# Patient Record
Sex: MALE | Race: WHITE | NOT HISPANIC OR LATINO
[De-identification: names, ages, dates, MRNs, and addresses within clinical notes are randomized per-mention and may not be internally consistent; named-entity substitution may affect disease eponyms.]

---

## 2021-06-08 ENCOUNTER — APPOINTMENT (OUTPATIENT)
Dept: VASCULAR SURGERY | Facility: CLINIC | Age: 71
End: 2021-06-08
Payer: MEDICARE

## 2021-06-08 VITALS
HEIGHT: 68 IN | BODY MASS INDEX: 35.77 KG/M2 | OXYGEN SATURATION: 95 % | SYSTOLIC BLOOD PRESSURE: 150 MMHG | HEART RATE: 49 BPM | WEIGHT: 236 LBS | DIASTOLIC BLOOD PRESSURE: 77 MMHG | TEMPERATURE: 97.5 F

## 2021-06-08 PROCEDURE — 99203 OFFICE O/P NEW LOW 30 MIN: CPT

## 2021-06-08 PROCEDURE — 93880 EXTRACRANIAL BILAT STUDY: CPT

## 2021-09-21 ENCOUNTER — APPOINTMENT (OUTPATIENT)
Dept: VASCULAR SURGERY | Facility: CLINIC | Age: 71
End: 2021-09-21
Payer: MEDICARE

## 2021-09-21 VITALS
DIASTOLIC BLOOD PRESSURE: 70 MMHG | OXYGEN SATURATION: 98 % | TEMPERATURE: 97.2 F | BODY MASS INDEX: 36.37 KG/M2 | HEART RATE: 44 BPM | WEIGHT: 240 LBS | HEIGHT: 68 IN | SYSTOLIC BLOOD PRESSURE: 186 MMHG

## 2021-09-21 VITALS — SYSTOLIC BLOOD PRESSURE: 191 MMHG | DIASTOLIC BLOOD PRESSURE: 82 MMHG

## 2021-09-21 PROCEDURE — 99213 OFFICE O/P EST LOW 20 MIN: CPT

## 2021-09-21 PROCEDURE — 93880 EXTRACRANIAL BILAT STUDY: CPT

## 2022-05-23 ENCOUNTER — EMERGENCY (EMERGENCY)
Facility: HOSPITAL | Age: 72
LOS: 1 days | Discharge: HOME | End: 2022-05-23
Attending: EMERGENCY MEDICINE
Payer: COMMERCIAL

## 2022-05-23 VITALS
SYSTOLIC BLOOD PRESSURE: 186 MMHG | DIASTOLIC BLOOD PRESSURE: 88 MMHG | RESPIRATION RATE: 16 BRPM | HEIGHT: 71 IN | WEIGHT: 248.24 LBS | TEMPERATURE: 99 F | HEART RATE: 72 BPM | OXYGEN SATURATION: 99 %

## 2022-05-23 VITALS
RESPIRATION RATE: 18 BRPM | OXYGEN SATURATION: 98 % | SYSTOLIC BLOOD PRESSURE: 177 MMHG | DIASTOLIC BLOOD PRESSURE: 75 MMHG | HEART RATE: 63 BPM

## 2022-05-23 DIAGNOSIS — I16.0 HYPERTENSIVE URGENCY: ICD-10-CM

## 2022-05-23 DIAGNOSIS — E78.5 HYPERLIPIDEMIA, UNSPECIFIED: ICD-10-CM

## 2022-05-23 DIAGNOSIS — I48.92 UNSPECIFIED ATRIAL FLUTTER: ICD-10-CM

## 2022-05-23 LAB
ALBUMIN SERPL ELPH-MCNC: 3.9 G/DL — SIGNIFICANT CHANGE UP (ref 3.3–5.2)
ALP SERPL-CCNC: 65 U/L — SIGNIFICANT CHANGE UP (ref 40–120)
ALT FLD-CCNC: 18 U/L — SIGNIFICANT CHANGE UP
ANION GAP SERPL CALC-SCNC: 12 MMOL/L — SIGNIFICANT CHANGE UP (ref 5–17)
APTT BLD: 27.9 SEC — SIGNIFICANT CHANGE UP (ref 27.5–35.5)
AST SERPL-CCNC: 11 U/L — SIGNIFICANT CHANGE UP
BASOPHILS # BLD AUTO: 0.04 K/UL — SIGNIFICANT CHANGE UP (ref 0–0.2)
BASOPHILS NFR BLD AUTO: 0.5 % — SIGNIFICANT CHANGE UP (ref 0–2)
BILIRUB SERPL-MCNC: 0.9 MG/DL — SIGNIFICANT CHANGE UP (ref 0.4–2)
BUN SERPL-MCNC: 24.9 MG/DL — HIGH (ref 8–20)
CALCIUM SERPL-MCNC: 9 MG/DL — SIGNIFICANT CHANGE UP (ref 8.6–10.2)
CHLORIDE SERPL-SCNC: 101 MMOL/L — SIGNIFICANT CHANGE UP (ref 98–107)
CO2 SERPL-SCNC: 28 MMOL/L — SIGNIFICANT CHANGE UP (ref 22–29)
CREAT SERPL-MCNC: 1.02 MG/DL — SIGNIFICANT CHANGE UP (ref 0.5–1.3)
EGFR: 80 ML/MIN/1.73M2 — SIGNIFICANT CHANGE UP
EOSINOPHIL # BLD AUTO: 0.29 K/UL — SIGNIFICANT CHANGE UP (ref 0–0.5)
EOSINOPHIL NFR BLD AUTO: 3.4 % — SIGNIFICANT CHANGE UP (ref 0–6)
GLUCOSE SERPL-MCNC: 208 MG/DL — HIGH (ref 70–99)
HCT VFR BLD CALC: 42.5 % — SIGNIFICANT CHANGE UP (ref 39–50)
HGB BLD-MCNC: 14.2 G/DL — SIGNIFICANT CHANGE UP (ref 13–17)
IMM GRANULOCYTES NFR BLD AUTO: 0.2 % — SIGNIFICANT CHANGE UP (ref 0–1.5)
INR BLD: 1.32 RATIO — HIGH (ref 0.88–1.16)
LYMPHOCYTES # BLD AUTO: 2.33 K/UL — SIGNIFICANT CHANGE UP (ref 1–3.3)
LYMPHOCYTES # BLD AUTO: 27.2 % — SIGNIFICANT CHANGE UP (ref 13–44)
MCHC RBC-ENTMCNC: 29.1 PG — SIGNIFICANT CHANGE UP (ref 27–34)
MCHC RBC-ENTMCNC: 33.4 GM/DL — SIGNIFICANT CHANGE UP (ref 32–36)
MCV RBC AUTO: 87.1 FL — SIGNIFICANT CHANGE UP (ref 80–100)
MONOCYTES # BLD AUTO: 0.53 K/UL — SIGNIFICANT CHANGE UP (ref 0–0.9)
MONOCYTES NFR BLD AUTO: 6.2 % — SIGNIFICANT CHANGE UP (ref 2–14)
NEUTROPHILS # BLD AUTO: 5.36 K/UL — SIGNIFICANT CHANGE UP (ref 1.8–7.4)
NEUTROPHILS NFR BLD AUTO: 62.5 % — SIGNIFICANT CHANGE UP (ref 43–77)
NT-PROBNP SERPL-SCNC: 560 PG/ML — HIGH (ref 0–300)
PLATELET # BLD AUTO: 273 K/UL — SIGNIFICANT CHANGE UP (ref 150–400)
POTASSIUM SERPL-MCNC: 3.7 MMOL/L — SIGNIFICANT CHANGE UP (ref 3.5–5.3)
POTASSIUM SERPL-SCNC: 3.7 MMOL/L — SIGNIFICANT CHANGE UP (ref 3.5–5.3)
PROT SERPL-MCNC: 5.7 G/DL — LOW (ref 6.6–8.7)
PROTHROM AB SERPL-ACNC: 15.3 SEC — HIGH (ref 10.5–13.4)
RBC # BLD: 4.88 M/UL — SIGNIFICANT CHANGE UP (ref 4.2–5.8)
RBC # FLD: 12.9 % — SIGNIFICANT CHANGE UP (ref 10.3–14.5)
SARS-COV-2 RNA SPEC QL NAA+PROBE: SIGNIFICANT CHANGE UP
SODIUM SERPL-SCNC: 141 MMOL/L — SIGNIFICANT CHANGE UP (ref 135–145)
TROPONIN T SERPL-MCNC: <0.01 NG/ML — SIGNIFICANT CHANGE UP (ref 0–0.06)
WBC # BLD: 8.57 K/UL — SIGNIFICANT CHANGE UP (ref 3.8–10.5)
WBC # FLD AUTO: 8.57 K/UL — SIGNIFICANT CHANGE UP (ref 3.8–10.5)

## 2022-05-23 PROCEDURE — 99284 EMERGENCY DEPT VISIT MOD MDM: CPT

## 2022-05-23 PROCEDURE — 83880 ASSAY OF NATRIURETIC PEPTIDE: CPT

## 2022-05-23 PROCEDURE — 71045 X-RAY EXAM CHEST 1 VIEW: CPT | Mod: 26

## 2022-05-23 PROCEDURE — 85025 COMPLETE CBC W/AUTO DIFF WBC: CPT

## 2022-05-23 PROCEDURE — 36415 COLL VENOUS BLD VENIPUNCTURE: CPT

## 2022-05-23 PROCEDURE — 85610 PROTHROMBIN TIME: CPT

## 2022-05-23 PROCEDURE — 93306 TTE W/DOPPLER COMPLETE: CPT | Mod: 26

## 2022-05-23 PROCEDURE — U0003: CPT

## 2022-05-23 PROCEDURE — 71045 X-RAY EXAM CHEST 1 VIEW: CPT

## 2022-05-23 PROCEDURE — 85730 THROMBOPLASTIN TIME PARTIAL: CPT

## 2022-05-23 PROCEDURE — 80053 COMPREHEN METABOLIC PANEL: CPT

## 2022-05-23 PROCEDURE — U0005: CPT

## 2022-05-23 PROCEDURE — 93010 ELECTROCARDIOGRAM REPORT: CPT | Mod: 76

## 2022-05-23 PROCEDURE — 93005 ELECTROCARDIOGRAM TRACING: CPT

## 2022-05-23 PROCEDURE — 84484 ASSAY OF TROPONIN QUANT: CPT

## 2022-05-23 PROCEDURE — C8929: CPT

## 2022-05-23 PROCEDURE — 93970 EXTREMITY STUDY: CPT

## 2022-05-23 PROCEDURE — 99285 EMERGENCY DEPT VISIT HI MDM: CPT | Mod: 25

## 2022-05-23 PROCEDURE — 93970 EXTREMITY STUDY: CPT | Mod: 26

## 2022-05-23 RX ORDER — SPIRONOLACTONE 25 MG/1
1 TABLET, FILM COATED ORAL
Qty: 30 | Refills: 0
Start: 2022-05-23 | End: 2022-06-21

## 2022-05-23 RX ORDER — SPIRONOLACTONE 25 MG/1
25 TABLET, FILM COATED ORAL DAILY
Refills: 0 | Status: DISCONTINUED | OUTPATIENT
Start: 2022-05-23 | End: 2022-05-28

## 2022-05-23 RX ORDER — RIVAROXABAN 15 MG-20MG
1 KIT ORAL
Qty: 30 | Refills: 0
Start: 2022-05-23 | End: 2022-06-21

## 2022-05-23 RX ORDER — HYDROCHLOROTHIAZIDE 25 MG
25 TABLET ORAL ONCE
Refills: 0 | Status: DISCONTINUED | OUTPATIENT
Start: 2022-05-23 | End: 2022-05-23

## 2022-05-23 RX ORDER — RIVAROXABAN 15 MG-20MG
20 KIT ORAL ONCE
Refills: 0 | Status: COMPLETED | OUTPATIENT
Start: 2022-05-23 | End: 2022-05-23

## 2022-05-23 RX ADMIN — SPIRONOLACTONE 25 MILLIGRAM(S): 25 TABLET, FILM COATED ORAL at 18:58

## 2022-05-23 RX ADMIN — RIVAROXABAN 20 MILLIGRAM(S): KIT at 18:58

## 2022-05-23 NOTE — ED PROVIDER NOTE - NS ED ROS FT
Review of Systems  •	CONSTITUTIONAL - no  fever, no diaphoresis, no weight change  •	SKIN - no rash  •	HEMATOLOGIC - no bleeding, no bruising  •	EYES - no eye pain, no blurred vision  •	ENT - no change in hearing, no pain  •	RESPIRATORY - no shortness of breath, no cough  •	CARDIAC - no chest pain, no palpitations  •	GI - no abd pain, no nausea, no vomiting, no diarrhea, no constipation, no bleeding  •	GENITO-URINARY - no discharge, no dysuria; no hematuria,   •	ENDO - no polydipsia, no polyuria, no heat/no cold intolerance  •	MUSCULOSKELETAL - no joint pain, (+) right swelling, no redness  •	NEUROLOGIC - no weakness, no headache, no anesthesia, no paresthesias  •	PSYCH - no anxiety, non suicidal, non homicidal, no hallucination, no depression

## 2022-05-23 NOTE — CONSULT NOTE ADULT - SUBJECTIVE AND OBJECTIVE BOX
[facility]                                              CARDIOLOGY AT [facility]                                                   [address], [place] [address]                                             Telephone: [phone]. Fax:[phone]                                                       CARDIOLOGY CONSULTATION NOTE                                                                                             History obtained by: Patient and medical record  Community Cardiologist: none   obtained: Yes [  ] No [ x ]  Reason for Consultation: HTN, new Afib  Available out pt records reviewed: Yes [ x ] No [  ]    Chief complaint:    Patient is a 71y old  Male who presents with a chief complaint of     HPI:  72 y/o M with PMH HTN, HLD, DM2, gout, obesity who presents to [facility] ED after being sent in from PCP office due to new onset Afib. Patient was seen at [facility] two days ago secondary to hypertensive urgency for several days. He was given clonidine x 1 and told to follow up with his PCP. He was then sent in after being found to be in Afib at PCP office. Afib is currently rate controlled in the 70s. He was recently started on lasix for BLE edema, right leg greater than left. He endorses becoming SOB with stair climbing and needing to rest [facility] up. He also endorses waking up at times at night due to dyspnea. Denies chest pain, back pain, headache, dizziness,  BATES, diaphoresis, syncope or N/V.      CARDIAC TESTING   ECHO:    STRESS:    CATH:     ELECTROPHYSIOLOGY:     PAST MEDICAL HISTORY    PAST SURGICAL HISTORY      SOCIAL HISTORY:   CIGARETTES:  former smoker, 1 PPD x 20 years   ALCOHOL: denies  DRUGS: denies    FAMILY HISTORY:  Mother - PPM, CVA, Afib  Father - mesothelioma    Family History of Cardiovascular Disease:  Yes [ x ] No [  ]  Coronary Artery Disease in first degree relative: Yes [  ] No [ x ]  Sudden Cardiac Death in First degree relative: Yes [  ] No [ x ]    HOME MEDICATIONS:      CURRENT CARDIAC MEDICATIONS:      CURRENT OTHER MEDICATIONS:      ALLERGIES:   No Known Allergies      REVIEW OF SYMPTOMS:   CONSTITUTIONAL: No fever, no chills, no weight loss, no weight gain, no fatigue   ENMT:  No vertigo; No sinus or throat pain  NECK: No pain or stiffness  CARDIOVASCULAR: No chest pain, no dyspnea, no syncope/presyncope, no palpitations, no dizziness, no Orthopnea, + Paroxsymal nocturnal dyspnea  RESPIRATORY: + Shortness of breath, no cough, no wheezing  : No dysuria, no hematuria   GI: No dark color stool, no nausea, no diarrhea, no constipation, no abdominal pain   NEURO: No headache, no slurred speech   MUSCULOSKELETAL: No joint pain or swelling; No muscle, back, or extremity pain  PSYCH: No agitation, no anxiety.    ALL OTHER REVIEW OF SYSTEMS ARE NEGATIVE.    VITAL SIGNS:  T(C): 37.1 (05-23-22 @ 13:04), Max: 37.1 (05-23-22 @ 13:04)  T(F): 98.8 (05-23-22 @ 13:04), Max: 98.8 (05-23-22 @ 13:04)  HR: 72 (05-23-22 @ 13:04) (72 - 72)  BP: 186/88 (05-23-22 @ 13:04) (186/88 - 186/88)  RR: 16 (05-23-22 @ 13:04) (16 - 16)  SpO2: 99% (05-23-22 @ 13:04) (99% - 99%)    INTAKE AND OUTPUT:       PHYSICAL EXAM:  Constitutional: Comfortable . No acute distress.   HEENT: Atraumatic and normocephalic , neck is supple . no JVD. No carotid bruit.  CNS: A&Ox3. No focal deficits.   Respiratory: CTAB, unlabored, sliight expiratory wheeze  Cardiovascular: irregular, s1 s2. No murmur. No rubs or gallop.  Gastrointestinal: obese, Soft, non-tender. +Bowel sounds.   Extremities: 2+ Peripheral Pulses, No clubbing, cyanosis, +3 nonpitting edema BLE, R greater than L  Psychiatric: Calm . no agitation.   Skin: Warm and dry, no ulcers on extremities     LABS:        INTERPRETATION OF TELEMETRY:     ECG: Aflutter with variable AV 71 bpm, incomplete RBB  Prior ECG: Yes [  ] No [ x ]    RADIOLOGY & ADDITIONAL STUDIES:    X-ray:    CT scan:   MRI:   US:                                                [facility]                                              CARDIOLOGY AT [facility]                                                   [address], [place] [address]                                             Telephone: [phone]. Fax:[phone]                                                       CARDIOLOGY CONSULTATION NOTE                                                                                             History obtained by: Patient and medical record  Community Cardiologist: none   obtained: Yes [  ] No [ x ]  Reason for Consultation: HTN, new Afib  Available out pt records reviewed: Yes [ x ] No [  ]    Chief complaint:    Patient is a 71y old  Male who presents with a chief complaint of     HPI:  72 y/o M with PMH HTN, HLD, DM2, gout, obesity who presents to [facility] ED after being sent in from PCP office due to new onset Afib. Patient was seen at [facility] two days ago secondary to hypertensive urgency for several days. He was given clonidine x 1 and told to follow up with his PCP. He was then sent in after being found to be in Afib at PCP office. Afib is currently rate controlled in the 70s. He was recently started on lasix for BLE edema, right leg greater than left. He endorses becoming SOB with stair climbing and needing to [facility]. He also endorses waking up at times at night due to dyspnea. Denies chest pain, back pain, headache, dizziness,  BATES, diaphoresis, syncope or N/V.      CARDIAC TESTING   ECHO:    STRESS:    CATH:     ELECTROPHYSIOLOGY:     PAST MEDICAL HISTORY    PAST SURGICAL HISTORY      SOCIAL HISTORY:   CIGARETTES:  former smoker, 1 PPD x 20 years   ALCOHOL: denies  DRUGS: denies    FAMILY HISTORY:  Mother - PPM, CVA, Afib  Father - mesothelioma    Family History of Cardiovascular Disease:  Yes [ x ] No [  ]  Coronary Artery Disease in first degree relative: Yes [  ] No [ x ]  Sudden Cardiac Death in First degree relative: Yes [  ] No [ x ]    HOME MEDICATIONS:  lisinopril 40 mg  metoprolol 25 mg  lasix 20 mg daily     ALLERGIES:   No Known Allergies    REVIEW OF SYMPTOMS:   CONSTITUTIONAL: No fever, no chills, no weight loss, no weight gain, no fatigue   ENMT:  No vertigo; No sinus or throat pain  NECK: No pain or stiffness  CARDIOVASCULAR: No chest pain, no dyspnea, no syncope/presyncope, no palpitations, no dizziness, no Orthopnea, + Paroxsymal nocturnal dyspnea  RESPIRATORY: + Shortness of breath, no cough, no wheezing  : No dysuria, no hematuria   GI: No dark color stool, no nausea, no diarrhea, no constipation, no abdominal pain   NEURO: No headache, no slurred speech   MUSCULOSKELETAL: No joint pain or swelling; No muscle, back, or extremity pain  PSYCH: No agitation, no anxiety.    ALL OTHER REVIEW OF SYSTEMS ARE NEGATIVE.    VITAL SIGNS:  T(C): 37.1 (05-23-22 @ 13:04), Max: 37.1 (05-23-22 @ 13:04)  T(F): 98.8 (05-23-22 @ 13:04), Max: 98.8 (05-23-22 @ 13:04)  HR: 72 (05-23-22 @ 13:04) (72 - 72)  BP: 186/88 (05-23-22 @ 13:04) (186/88 - 186/88)  RR: 16 (05-23-22 @ 13:04) (16 - 16)  SpO2: 99% (05-23-22 @ 13:04) (99% - 99%)    PHYSICAL EXAM:  Constitutional: Comfortable . No acute distress.   HEENT: Atraumatic and normocephalic , neck is supple . no JVD. No carotid bruit.  CNS: A&Ox3. No focal deficits.   Respiratory: CTAB, unlabored, sliight expiratory wheeze  Cardiovascular: irregular, s1 s2. No murmur. No rubs or gallop.  Gastrointestinal: obese, Soft, non-tender. +Bowel sounds.   Extremities: 2+ Peripheral Pulses, No clubbing, cyanosis, +3 nonpitting edema BLE, R greater than L  Psychiatric: Calm . no agitation.   Skin: Warm and dry, no ulcers on extremities     ECG: Aflutter with variable AV 71 bpm, incomplete RBB  Prior ECG: Yes [  ] No [ x ]                                                [facility]                                              CARDIOLOGY AT [address], [place] [address]                                             Telephone: [phone]. Fax:[phone]                                                       CARDIOLOGY CONSULTATION NOTE                                                                                             History obtained by: Patient and medical record  Community Cardiologist: none   obtained: Yes [  ] No [ x ]  Reason for Consultation: HTN, new Afib  Available out pt records reviewed: Yes [ x ] No [  ]    Chief complaint:    Patient is a 71y old  Male who presents with a chief complaint of     HPI:  72 y/o M with PMH HTN, HLD, DM2, gout, obesity who presents to [facility] ED after being sent in from PCP office due to new onset Afib. Patient was seen at [facility] two days ago secondary to hypertensive urgency for several days. He was given clonidine x 1 and told to follow up with his PCP. He was then sent in after being found to be in Afib at PCP office. Afib is currently rate controlled in the 70s. He was recently started on lasix for BLE edema, right leg greater than left. He endorses becoming SOB with stair climbing and needing to rest shelter up. He also endorses waking up at times at night due to dyspnea. Denies chest pain, back pain, headache, dizziness,  BATES, diaphoresis, syncope or N/V.      CARDIAC TESTING   ECHO:    STRESS:    CATH:     ELECTROPHYSIOLOGY:     PAST MEDICAL HISTORY    PAST SURGICAL HISTORY      SOCIAL HISTORY:   CIGARETTES:  former smoker, 1 PPD x 20 years   ALCOHOL: denies  DRUGS: denies    FAMILY HISTORY:  Mother - PPM, CVA, Afib  Father - mesothelioma    Family History of Cardiovascular Disease:  Yes [ x ] No [  ]  Coronary Artery Disease in first degree relative: Yes [  ] No [ x ]  Sudden Cardiac Death in First degree relative: Yes [  ] No [ x ]    HOME MEDICATIONS:  lisinopril 40 mg  metoprolol 25 mg  lasix 20 mg daily   crestor 20 mg daily    ALLERGIES:   No Known Allergies    REVIEW OF SYMPTOMS:   CONSTITUTIONAL: No fever, no chills, no weight loss, no weight gain, no fatigue   ENMT:  No vertigo; No sinus or throat pain  NECK: No pain or stiffness  CARDIOVASCULAR: No chest pain, no dyspnea, no syncope/presyncope, no palpitations, no dizziness, no Orthopnea, + Paroxsymal nocturnal dyspnea  RESPIRATORY: + Shortness of breath, no cough, no wheezing  : No dysuria, no hematuria   GI: No dark color stool, no nausea, no diarrhea, no constipation, no abdominal pain   NEURO: No headache, no slurred speech   MUSCULOSKELETAL: No joint pain or swelling; No muscle, back, or extremity pain  PSYCH: No agitation, no anxiety.    ALL OTHER REVIEW OF SYSTEMS ARE NEGATIVE.    VITAL SIGNS:  T(C): 37.1 (05-23-22 @ 13:04), Max: 37.1 (05-23-22 @ 13:04)  T(F): 98.8 (05-23-22 @ 13:04), Max: 98.8 (05-23-22 @ 13:04)  HR: 72 (05-23-22 @ 13:04) (72 - 72)  BP: 186/88 (05-23-22 @ 13:04) (186/88 - 186/88)  RR: 16 (05-23-22 @ 13:04) (16 - 16)  SpO2: 99% (05-23-22 @ 13:04) (99% - 99%)    PHYSICAL EXAM:  Constitutional: Comfortable . No acute distress.   HEENT: Atraumatic and normocephalic , neck is supple . no JVD. No carotid bruit.  CNS: A&Ox3. No focal deficits.   Respiratory: CTAB, unlabored, sliight expiratory wheeze  Cardiovascular: irregular, s1 s2. No murmur. No rubs or gallop.  Gastrointestinal: obese, Soft, non-tender. +Bowel sounds.   Extremities: 2+ Peripheral Pulses, No clubbing, cyanosis, +3 nonpitting edema BLE, R greater than L  Psychiatric: Calm . no agitation.   Skin: Warm and dry, no ulcers on extremities     ECG: Aflutter with variable AV 71 bpm, incomplete RBB  Prior ECG: Yes [  ] No [ x ]

## 2022-05-23 NOTE — ED PROVIDER NOTE - PHYSICAL EXAMINATION
VITAL SIGNS: I have reviewed nursing notes and confirm.  CONSTITUTIONAL:  in no acute distress.  SKIN: Skin exam is warm and dry, no acute rash.  HEAD: Normocephalic; atraumatic.  EYES: PERRL, EOM intact; conjunctiva and sclera clear.  ENT: No nasal discharge; airway clear. Throat clear.  NECK: Supple; non tender.    CARD: Regular rate and rhythm.  RESP: No wheezes,  no rales or rhonchi.   ABD:  soft; non-distended; non-tender;   EXT: Normal ROM. No clubbing, cyanosis (+) b/l pitting edema, R>L   NEURO: Alert, oriented. Grossly unremarkable. No focal deficits.  moves all extremities,  normal gait   PSYCH: Cooperative, appropriate.

## 2022-05-23 NOTE — ED PROVIDER NOTE - OBJECTIVE STATEMENT
72 yo M hx of HTN and HLD send in for new onset Afib found by PMD today. no fever. no cough. no congestion. no travel. patient report right leg swelling x 1 week with more pitting edema. no  meds given in the office. sinus now.

## 2022-05-23 NOTE — CONSULT NOTE ADULT - PROBLEM SELECTOR RECOMMENDATION 2
.  - SBP 180s on admission  - metoprolol succinate 25mg PO daily  - lisinopril 40mg daily  - amlodipine contraindicated due to previous reaction (possibly LE edema?)  - recc hydralazine 25mg PO TID for further BP control  - Renal US ordered to r/o renal artery stenosis .- SBP 180s on admission  - metoprolol succinate 25mg PO daily  - lisinopril 40mg daily  - amlodipine contraindicated due to previous reaction (possibly LE edema?)  - recc hydralazine 25mg PO TID for further BP control  - Renal US ordered to r/o renal artery stenosis

## 2022-05-23 NOTE — CONSULT NOTE ADULT - ASSESSMENT
70 y/o M with PMH HTN, HLD, DM2, gout, obesity who presents to [facility] ED after being sent in from PCP office due to new onset Afib. Patient was seen at [facility] two days ago secondary to hypertensive urgency for several days. He was given clonidine x 1 and told to follow up with his PCP. He was then sent in after being found to be in Afib at PCP office. Afib is currently rate controlled in the 70s. He was recently started on lasix for BLE edema, right leg greater than left. He endorses becoming SOB with stair climbing and needing to rest nursing home up. He also endorses waking up at times at night due to dyspnea. Denies chest pain, back pain, headache, dizziness,  BATES, diaphoresis, syncope or N/V.

## 2022-05-23 NOTE — ED PROVIDER NOTE - CLINICAL SUMMARY MEDICAL DECISION MAKING FREE TEXT BOX
70 yo M send in by cards for new onset afib. seen by cardiology. will get blood work, cxr, duplex, echo.

## 2022-05-23 NOTE — CONSULT NOTE ADULT - NS ATTEND AMEND GEN_ALL_CORE FT
pt was seen and examined  plan of care dw np  Pt  is now in nsr  Due to [id number] of >3, start pt on xarelto 20 mg with supper. Risk of bleeding and stroke was dw pt.   Add aldactone 25 mg / aldactone 25 mg daily in addition to other pills  no asa  no lasix  TTE reviewed as well  FU in office in 1-2 weeks pt was seen and examined  plan of care dw np  Pt  is now in nsr  Due to [id number] of >3, start pt on xarelto 20 mg with supper. Risk of bleeding and stroke was dw pt.   Add aldactone 25 mg / aldactone 25 mg daily in addition to other pills.  advise pt to increase fluid intake   no asa  no lasix  TTE reviewed as well  FU in office in 1-2 weeks pt was seen and examined  plan of care ruthann np  Pt  is now in nsr  Due to [id number] of >3, start pt on xarelto 20 mg with supper. Risk of bleeding and stroke was dw pt.   Add aldactone 50 mg daily in addition to other pills.  advise pt to increase fluid intake.  No DVT, left leg smaller than the right leg.   no asa  no lasix  TTE reviewed as well  FU in office in 1-2 weeks  RUTHANN Rice.

## 2022-05-23 NOTE — ED ADULT TRIAGE NOTE - CHIEF COMPLAINT QUOTE
Pt sent in by cardiology for new onset AFib with controlled rate. Pt was in the office for elevated BP. Denies any palpitations, chest pain. Keke from cardiology is expecting him [phone]

## 2022-05-23 NOTE — CONSULT NOTE ADULT - PROBLEM SELECTOR RECOMMENDATION 9
.  - [id number] 3 (age, HTN, DM)  - new onset Afib/Aflutter rate controlled at this time in 70s  - pending TTE for evaluation of cardiac function and any valvular abnormalities  - patient has not had NST or TTE since 2019  - started on Lasix 3 weeks ago for BLE edema  - US b/l Legs for edema, r/o DVT  - check BNP, troponins  - may need EP consult for potential cardioversion if he does not convert on his own  - further reccs to follow .- [id number] 3 (age, HTN, DM)  - new onset Afib/Aflutter rate controlled at this time in 70s  - pending TTE for evaluation of cardiac function and any valvular abnormalities  - patient has not had NST or TTE since 2019  - started on Lasix 3 weeks ago for BLE edema  - US b/l Legs for edema, r/o DVT  - check BNP, troponins  - may need EP consult for potential cardioversion if he does not convert on his own  - further reccs to follow

## 2022-05-23 NOTE — ED ADULT NURSE NOTE - CHIEF COMPLAINT QUOTE
Pt sent in by cardiology for new onset AFib with controlled rate. Pt was in the office for elevated BP. Denies any palpitations, chest pain. Keke from cardiology is expecting him [phone]

## 2022-05-23 NOTE — ED PROVIDER NOTE - PATIENT PORTAL LINK FT
You can access the FollowMyHealth Patient Portal offered by [facility] by registering at the following website: [email or web address]. By joining [facility]’s FollowMyHealth portal, you will also be able to view your health information using other applications (apps) compatible with our system.

## 2022-05-23 NOTE — ED ADULT NURSE NOTE - NSIMPLEMENTINTERV_GEN_ALL_ED
Implemented All Universal Safety Interventions:  [place] to call system. Call bell, personal items and telephone within reach. Instruct patient to call for assistance. Room bathroom lighting operational. Non-slip footwear when patient is off stretcher. Physically safe environment: no spills, clutter or unnecessary equipment. Stretcher in lowest position, wheels locked, appropriate side rails in place.

## 2022-05-27 DIAGNOSIS — M17.12 UNILATERAL PRIMARY OSTEOARTHRITIS, LEFT KNEE: ICD-10-CM

## 2022-05-27 PROBLEM — I10 ESSENTIAL (PRIMARY) HYPERTENSION: Chronic | Status: ACTIVE | Noted: 2022-05-23

## 2022-05-27 RX ORDER — HYDROCHLOROTHIAZIDE 25 MG/1
25 TABLET ORAL
Qty: 90 | Refills: 0 | Status: DISCONTINUED | COMMUNITY
Start: 2021-09-13 | End: 2022-05-27

## 2022-06-03 ENCOUNTER — NON-APPOINTMENT (OUTPATIENT)
Age: 72
End: 2022-06-03

## 2022-06-03 ENCOUNTER — APPOINTMENT (OUTPATIENT)
Dept: CARDIOLOGY | Facility: CLINIC | Age: 72
End: 2022-06-03
Payer: MEDICARE

## 2022-06-03 VITALS
SYSTOLIC BLOOD PRESSURE: 151 MMHG | OXYGEN SATURATION: 99 % | HEIGHT: 68 IN | BODY MASS INDEX: 36.37 KG/M2 | DIASTOLIC BLOOD PRESSURE: 73 MMHG | HEART RATE: 49 BPM | TEMPERATURE: 98.4 F | WEIGHT: 240 LBS

## 2022-06-03 PROCEDURE — 93000 ELECTROCARDIOGRAM COMPLETE: CPT

## 2022-06-03 PROCEDURE — 99214 OFFICE O/P EST MOD 30 MIN: CPT

## 2022-06-03 RX ORDER — CLONIDINE HYDROCHLORIDE 0.1 MG/1
0.1 TABLET ORAL
Qty: 30 | Refills: 0 | Status: ACTIVE | COMMUNITY
Start: 2022-05-27

## 2022-06-04 RX ORDER — SPIRONOLACTONE 50 MG/1
50 TABLET ORAL
Qty: 30 | Refills: 0 | Status: DISCONTINUED | COMMUNITY
Start: 2022-05-24

## 2022-06-04 RX ORDER — FUROSEMIDE 20 MG/1
20 TABLET ORAL
Qty: 90 | Refills: 0 | Status: DISCONTINUED | COMMUNITY
Start: 2022-05-05

## 2022-06-06 ENCOUNTER — NON-APPOINTMENT (OUTPATIENT)
Age: 72
End: 2022-06-06

## 2022-06-16 ENCOUNTER — APPOINTMENT (OUTPATIENT)
Dept: CARDIOLOGY | Facility: CLINIC | Age: 72
End: 2022-06-16

## 2022-06-20 VITALS — SYSTOLIC BLOOD PRESSURE: 182 MMHG | DIASTOLIC BLOOD PRESSURE: 90 MMHG

## 2022-06-20 VITALS — HEART RATE: 73 BPM

## 2022-06-20 VITALS — SYSTOLIC BLOOD PRESSURE: 210 MMHG | DIASTOLIC BLOOD PRESSURE: 90 MMHG

## 2022-06-20 RX ORDER — VALSARTAN 320 MG/1
320 TABLET, COATED ORAL
Qty: 90 | Refills: 3 | Status: ACTIVE | COMMUNITY
Start: 2022-06-20 | End: 1900-01-01

## 2022-06-21 ENCOUNTER — NON-APPOINTMENT (OUTPATIENT)
Age: 72
End: 2022-06-21

## 2022-06-24 RX ORDER — RIVAROXABAN 20 MG/1
20 TABLET, FILM COATED ORAL
Qty: 90 | Refills: 0 | Status: ACTIVE | COMMUNITY
Start: 1900-01-01 | End: 1900-01-01

## 2022-06-28 ENCOUNTER — APPOINTMENT (OUTPATIENT)
Dept: CARDIOLOGY | Facility: CLINIC | Age: 72
End: 2022-06-28

## 2022-06-28 DIAGNOSIS — I10 ESSENTIAL (PRIMARY) HYPERTENSION: ICD-10-CM

## 2022-06-28 DIAGNOSIS — R60.0 LOCALIZED EDEMA: ICD-10-CM

## 2022-06-28 DIAGNOSIS — I48.91 UNSPECIFIED ATRIAL FIBRILLATION: ICD-10-CM

## 2022-07-16 LAB
ALDOSTERONE SERUM: 4.9 NG/DL
CORTIS SERPL-MCNC: 5.9 UG/DL
METANEPHRINE, PL: 11 PG/ML
NORMETANEPHRINE, PL: 93.1 PG/ML
RENIN ACTIVITY, PLASMA: <0.167 NG/ML/HR
RENIN PLASMA: 5.1 PG/ML

## 2022-07-29 ENCOUNTER — APPOINTMENT (OUTPATIENT)
Dept: CARDIOLOGY | Facility: CLINIC | Age: 72
End: 2022-07-29
Payer: MEDICARE

## 2022-07-29 PROCEDURE — 93975 VASCULAR STUDY: CPT

## 2022-07-29 PROCEDURE — 76770 US EXAM ABDO BACK WALL COMP: CPT | Mod: 59

## 2022-08-04 ENCOUNTER — APPOINTMENT (OUTPATIENT)
Dept: CARDIOLOGY | Facility: CLINIC | Age: 72
End: 2022-08-04

## 2022-08-04 VITALS
HEIGHT: 68 IN | TEMPERATURE: 98.7 F | OXYGEN SATURATION: 97 % | SYSTOLIC BLOOD PRESSURE: 173 MMHG | DIASTOLIC BLOOD PRESSURE: 67 MMHG | BODY MASS INDEX: 37.59 KG/M2 | WEIGHT: 248 LBS | HEART RATE: 50 BPM

## 2022-08-04 VITALS — DIASTOLIC BLOOD PRESSURE: 70 MMHG | SYSTOLIC BLOOD PRESSURE: 164 MMHG

## 2022-08-04 DIAGNOSIS — R60.0 LOCALIZED EDEMA: ICD-10-CM

## 2022-08-04 PROBLEM — I48.91 ATRIAL FIBRILLATION, UNSPECIFIED TYPE: Status: ACTIVE | Noted: 2022-06-03

## 2022-08-04 PROCEDURE — 99214 OFFICE O/P EST MOD 30 MIN: CPT

## 2022-08-04 RX ORDER — TERAZOSIN 5 MG/1
5 CAPSULE ORAL DAILY
Refills: 0 | Status: ACTIVE | COMMUNITY
Start: 2022-06-03

## 2022-08-04 RX ORDER — COLCHICINE 0.6 MG/1
0.6 TABLET ORAL
Qty: 60 | Refills: 0 | Status: DISCONTINUED | COMMUNITY
Start: 2022-03-28 | End: 2022-08-04

## 2022-08-04 RX ORDER — DICLOFENAC SODIUM 1% 10 MG/G
1 GEL TOPICAL
Qty: 1 | Refills: 1 | Status: DISCONTINUED | COMMUNITY
Start: 2022-05-27 | End: 2022-08-04

## 2022-08-04 RX ORDER — METOPROLOL SUCCINATE 25 MG/1
25 TABLET, EXTENDED RELEASE ORAL DAILY
Refills: 0 | Status: ACTIVE | COMMUNITY

## 2022-08-04 RX ORDER — TERAZOSIN 10 MG/1
10 CAPSULE ORAL
Qty: 90 | Refills: 3 | Status: DISCONTINUED | COMMUNITY
Start: 2022-06-03 | End: 2022-08-04

## 2022-08-04 RX ORDER — ALLOPURINOL 100 MG/1
100 TABLET ORAL TWICE DAILY
Refills: 0 | Status: ACTIVE | COMMUNITY

## 2022-08-04 RX ORDER — BACLOFEN 10 MG/1
10 TABLET ORAL
Qty: 30 | Refills: 0 | Status: DISCONTINUED | COMMUNITY
Start: 2022-02-25 | End: 2022-08-04

## 2022-08-04 RX ORDER — FUROSEMIDE 20 MG/1
20 TABLET ORAL DAILY
Refills: 0 | Status: ACTIVE | COMMUNITY

## 2022-08-04 RX ORDER — ALPRAZOLAM 0.25 MG/1
0.25 TABLET ORAL
Qty: 30 | Refills: 0 | Status: DISCONTINUED | COMMUNITY
Start: 2022-06-28

## 2022-08-04 RX ORDER — AMOXICILLIN 500 MG/1
500 CAPSULE ORAL
Qty: 21 | Refills: 0 | Status: DISCONTINUED | COMMUNITY
Start: 2022-06-06

## 2022-08-04 RX ORDER — POTASSIUM CHLORIDE 750 MG/1
TABLET, FILM COATED, EXTENDED RELEASE ORAL DAILY
Refills: 0 | Status: DISCONTINUED | COMMUNITY

## 2022-08-04 RX ORDER — ROSUVASTATIN CALCIUM 20 MG/1
20 TABLET, FILM COATED ORAL DAILY
Refills: 0 | Status: ACTIVE | COMMUNITY

## 2022-08-04 RX ORDER — POTASSIUM CHLORIDE 750 MG/1
10 TABLET, FILM COATED, EXTENDED RELEASE ORAL
Qty: 30 | Refills: 0 | Status: ACTIVE | COMMUNITY
Start: 2022-07-20

## 2022-08-05 ENCOUNTER — APPOINTMENT (OUTPATIENT)
Dept: CARDIOLOGY | Facility: CLINIC | Age: 72
End: 2022-08-05

## 2022-08-05 ENCOUNTER — NON-APPOINTMENT (OUTPATIENT)
Age: 72
End: 2022-08-05

## 2022-08-05 PROCEDURE — 93971 EXTREMITY STUDY: CPT

## 2022-08-08 ENCOUNTER — NON-APPOINTMENT (OUTPATIENT)
Age: 72
End: 2022-08-08

## 2022-08-08 ENCOUNTER — APPOINTMENT (OUTPATIENT)
Dept: VASCULAR SURGERY | Facility: CLINIC | Age: 72
End: 2022-08-08

## 2022-08-08 VITALS
HEIGHT: 68 IN | SYSTOLIC BLOOD PRESSURE: 107 MMHG | HEART RATE: 69 BPM | OXYGEN SATURATION: 98 % | WEIGHT: 251 LBS | DIASTOLIC BLOOD PRESSURE: 56 MMHG | TEMPERATURE: 98.1 F | RESPIRATION RATE: 16 BRPM | BODY MASS INDEX: 38.04 KG/M2

## 2022-08-08 DIAGNOSIS — I65.22 OCCLUSION AND STENOSIS OF LEFT CAROTID ARTERY: ICD-10-CM

## 2022-08-08 PROCEDURE — 93880 EXTRACRANIAL BILAT STUDY: CPT

## 2022-08-08 PROCEDURE — 99213 OFFICE O/P EST LOW 20 MIN: CPT

## 2022-08-08 RX ORDER — HYDRALAZINE HYDROCHLORIDE 50 MG/1
50 TABLET ORAL TWICE DAILY
Qty: 60 | Refills: 0 | Status: ACTIVE | COMMUNITY
Start: 2022-08-04

## 2022-08-08 NOTE — PROCEDURE
[FreeTextEntry1] : Studies: \par \par 7/19/22 CT neck with IV contrast: severe stenosis of left carotid artery. Decreased soft tissue infiltrate surrounding left carotid. \par \par 8/8/22 carotid artery duplex: right ICA <50%. left ICA 50-69% stenosis

## 2022-08-08 NOTE — DATA REVIEWED
[FreeTextEntry1] : U/S Carotid Duplex 6/8/21 demonstrates R ICA with less than 50% stenosis and L ICA with 50-69% stenosis. Vertebral flow antegrade\par U/S Carotid Duplex 9/21/21 demonstrates R ICA with less than 50% stenosis and L ICA with 50-69% stenosis. Vertebral flow antegrade.

## 2022-08-08 NOTE — HISTORY OF PRESENT ILLNESS
[FreeTextEntry1] : 9/21/21: 69 y/o male with moderate carotid stenosis here for followup. He reports no change in condition. He denies headaches, dizziness, lightheadedness, visual changes, amaurosis fugax, neurological or motor deficit. He maintains excellent control of his blood pressure. He takes Rosuvastatin 20 mg and aspirin 81 mg daily. No current pain. No fever, chills. He is a nonsmoker.\par \par 8/8/22: Pt doing well since last visit. He had a CT scan of his neck in July at [facility] and was told his left carotid artery stenosis is worse. He denies headaches, dizziness, lightheadedness, visual changes, amaurosis fugax, neurological or motor deficit. He takes Rosuvastatin 20 mg and Xarelto 20 mg for a fib. No current pain. No fever, chills. He is a nonsmoker.

## 2022-08-08 NOTE — PHYSICAL EXAM
[2+] : left 2+ [Right Carotid Bruit] : no bruit heard over the right carotid [Left Carotid Bruit] : no bruit heard over the left carotid [de-identified] : WD, WN, NAD. Awake, alert, interactive. Ambulates without difficulty [de-identified] : non-labored

## 2022-08-08 NOTE — ASSESSMENT
[FreeTextEntry1] : 72 y/o male with carotid stenosis here for followup. U/S was unchanged showing: L ICA with 50-69% stenosis. He continues to be asymptomatic. He will continue his medications are unchanged and walk daily for exercise.\par \par \par Plan:\par Continue Xarelto and Statin QD as advised.\par Maintain medical management of HTN, HLD and DM..\par Maintain a balanced diet and adequate hydration..\par Walk daily for exercise to assist with collateral circulation.\par U/S completed today. I discussed results fully with the patient. All questions and concerns have been discussed to patient satisfaction.\par RTC in 3 months for repeat carotid artery duplex \par \par A total of 25 minutes was spent with patient and coordinating care.\par \par

## 2022-08-15 ENCOUNTER — APPOINTMENT (OUTPATIENT)
Dept: CARDIOLOGY | Facility: CLINIC | Age: 72
End: 2022-08-15

## 2022-08-15 PROCEDURE — 78452 HT MUSCLE IMAGE SPECT MULT: CPT

## 2022-08-15 PROCEDURE — A9500: CPT

## 2022-08-15 PROCEDURE — 93015 CV STRESS TEST SUPVJ I&R: CPT

## 2022-08-17 ENCOUNTER — NON-APPOINTMENT (OUTPATIENT)
Age: 72
End: 2022-08-17

## 2022-08-19 ENCOUNTER — NON-APPOINTMENT (OUTPATIENT)
Age: 72
End: 2022-08-19

## 2022-09-01 ENCOUNTER — NON-APPOINTMENT (OUTPATIENT)
Age: 72
End: 2022-09-01

## 2022-09-01 ENCOUNTER — APPOINTMENT (OUTPATIENT)
Dept: CARDIOLOGY | Facility: CLINIC | Age: 72
End: 2022-09-01

## 2022-09-13 NOTE — ED ADULT NURSE NOTE - HOW OFTEN DO YOU HAVE A DRINK CONTAINING ALCOHOL?
[de-identified] : \par Indication:\par Osteoarthritis of left shoulder\par \par Consent: \par At this time, I have recommended an injection to the left shoulder.  The risks and benefits of the procedure were discussed with the patient in detail.  Upon verbal consent of the patient, we proceeded with the injection as noted below.  \par \par Description of Procedure:  \par After a sterile prep, the patient underwent an injection of approximately 3 mL of 1% Xylocaine 20 mg per 2 mL (10 mg per mL) without epinephrine and 2 mL of Kenalog (40 mg/mL) into the left shoulder.  The patient tolerated the procedure well.  There were no complications. \par \par : [place]\par Drug Name: Xylocaine-MPF (Lidocaine HCI injection, USP)\par NDC#: 78619-102-50\par Lot#:   [id number]\par Expiration Date: 05/26\par \par \par  
Monthly or less

## 2023-06-06 ENCOUNTER — APPOINTMENT (OUTPATIENT)
Dept: ORTHOPEDIC SURGERY | Facility: CLINIC | Age: 73
End: 2023-06-06
Payer: MEDICARE

## 2023-06-06 ENCOUNTER — NON-APPOINTMENT (OUTPATIENT)
Age: 73
End: 2023-06-06

## 2023-06-06 VITALS — WEIGHT: 251 LBS | BODY MASS INDEX: 38.04 KG/M2 | HEIGHT: 68 IN

## 2023-06-06 DIAGNOSIS — M47.26 OTHER SPONDYLOSIS WITH RADICULOPATHY, LUMBAR REGION: ICD-10-CM

## 2023-06-06 PROCEDURE — 99204 OFFICE O/P NEW MOD 45 MIN: CPT

## 2023-06-06 PROCEDURE — 72100 X-RAY EXAM L-S SPINE 2/3 VWS: CPT

## 2023-06-07 NOTE — HISTORY OF PRESENT ILLNESS
[de-identified] : Patient is here for right LE pain that began 3 weeks ago.He states that he has a history of sciatica. He was treated with steroid injections about 4 weeks ago. He states that his leg feels weak. He had a fall down stairs. He has been using a cane. He states today that he has noticed improvement. He states he was told to see a spine specialist in the past but he does not feel like his spine is his problem. He feels that the problem is his knee and down. He has used a knee brace. He has used Tylenol. Denies N/T/Prior injury. 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 He does not exercise. Denies / bowel or bladder dysfunction/ saddle anesthesia \par \par The patient's past medical history, past surgical history, medications and allergies were reviewed by me today and documented accordingly. In addition, the patient's family and social history, which were noncontributory to this visit, were reviewed also. Intake form was reviewed. The patient has no family history of arthritis.

## 2023-06-07 NOTE — PHYSICAL EXAM
[de-identified] : Constitutional: Well-nourished, well-developed, No acute distress\par Respiratory:  Good respiratory effort, no SOB\par Lymphatic: No regional lymphadenopathy, no lymphedema\par Psychiatric: Pleasant and normal affect, alert and oriented x3\par Musculoskeletal: normal except where as noted in regional exam\par \par Lumbar Spine Exam\par \par APPEARANCE: no marked deformities or malalignment, + loss of lordotic curvature of the lumbosacral spine. + poor posture\par POSITIVE TENDERNESS: + IL/SI/ST ligs, + TTP of b/l SI joints, + b/l lower lumbar tenderness and spasm noted in erector spinae and quadratus lumborum\par NONTENDER: no bony midline tenderness.\par ROM: Mildly limited in all directions, mildly painful at end range of flexion and extension\par RESISTIVE TESTING: painful 4/5 resisted flex/ext, sidebending b/l, and rotation\par SPECIAL TESTS: neg SLR b/l, neg ARIK b/l, neg Trendelenburg b/l \par PULSES: 2+ DP/PT pulses\par NEURO:  + Weakness on the right lower extremity with 3/5 hip flexion, knee extension, knee flexion, ankle dorsiflexion, ankle plantarflexion, great toe extension.  L1 - S2 intact to sensation bilaterally, 5/5 motor function on the left lower extremity. DTRs 2+/4 patella and achilles on the left, 1+/4 patella and Achilles on the right\par \par \par  [de-identified] : The following radiographs were ordered and read by me during this patient's visit. I reviewed each radiograph in detail with the patient and discussed the findings as highlighted below. \par \par 2 views of the lumbar spine were obtained today that show degenerative changes and evidence of moderate multilevel osteoarthritis most notably in the L4-L5 and L5-S1 levels.  No fracture, or dislocation seen at this time. There is no malalignment. No other obvious osseous abnormality. Otherwise unremarkable.

## 2023-06-07 NOTE — DISCUSSION/SUMMARY
[de-identified] : Discussed findings of today's exam and possible causes of patient's pain.  Educated patient on their most probable diagnosis of chronic intermittent right lower extremity pain and weakness with recent atraumatic exacerbation due to lumbar osteoarthritis and subsequent right lower extremity radiculopathy.  Reviewed possible courses of treatment, and we collaboratively decided best course of treatment at this time will include conservative management.  Patient has history of sciatica in the past, he recently received an injection into his right buttock about 3 weeks ago by an outside provider, this gave him some relief of his buttock and thigh pain but did not address the weakness throughout his right lower extremity.  Patient is advised that based on history and clinical exam as well as in office x-rays today I feel that lumbar osteoarthritis with right lower extremity radiculopathy is his primary etiology of pain.  At this time I recommend we proceed with MRI of the lumbar spine to evaluate for extent of degenerative disc disease and/or spinal stenosis.  Patient would benefit from physiatry consultation thereafter to discuss risk/benefits of injections.  I believe injection will be important to help alleviate inflammation around the nerve and then he would be able to get more benefit from physical therapy.  Patient is currently on anticoagulation therapy, he cannot take oral NSAIDs.  If patient has persisting pain and weakness may consider short course of prednisone at that time, but patient is currently not in severe pain, his pain is actually been improving the last few days.  Patient is advised we can review his MRI results over the phone as soon as they are available as I do not perform spine injections as part of my sports medicine practice, he would benefit from physiatry consultation at that point.  Patient appreciates and agrees with current plan.\par  \par \par This note was generated using dragon medical dictation software.  A reasonable effort has been made for proofreading its contents, but typos may still remain.  If there are any questions or points of clarification needed please notify my office.